# Patient Record
Sex: FEMALE | Race: WHITE | Employment: UNEMPLOYED | URBAN - METROPOLITAN AREA
[De-identification: names, ages, dates, MRNs, and addresses within clinical notes are randomized per-mention and may not be internally consistent; named-entity substitution may affect disease eponyms.]

---

## 2020-02-25 ENCOUNTER — HOSPITAL ENCOUNTER (OUTPATIENT)
Dept: LAB | Age: 65
Discharge: HOME | End: 2020-02-25

## 2020-02-25 ENCOUNTER — HOSPITAL ENCOUNTER (OUTPATIENT)
Dept: PREADMISSION TESTING | Age: 65
Discharge: HOME | End: 2020-02-25
Payer: COMMERCIAL

## 2020-02-25 LAB
ALBUMIN SERPL-MCNC: 3.7 G/DL (ref 3.4–5)
ALBUMIN/GLOB SERPL: 0.9 {RATIO} (ref 0.8–1.7)
ALP SERPL-CCNC: 188 U/L (ref 45–117)
ALT SERPL-CCNC: 36 U/L (ref 13–56)
ANION GAP SERPL CALC-SCNC: 5 MMOL/L (ref 3–18)
AST SERPL-CCNC: 26 U/L (ref 10–38)
BASOPHILS # BLD: 0 K/UL (ref 0–0.1)
BASOPHILS NFR BLD: 1 % (ref 0–2)
BILIRUB SERPL-MCNC: 0.4 MG/DL (ref 0.2–1)
BUN SERPL-MCNC: 20 MG/DL (ref 7–18)
BUN/CREAT SERPL: 25 (ref 12–20)
CALCIUM SERPL-MCNC: 9.6 MG/DL (ref 8.5–10.1)
CHLORIDE SERPL-SCNC: 105 MMOL/L (ref 100–111)
CO2 SERPL-SCNC: 32 MMOL/L (ref 21–32)
CREAT SERPL-MCNC: 0.8 MG/DL (ref 0.6–1.3)
DIFFERENTIAL METHOD BLD: ABNORMAL
EOSINOPHIL # BLD: 0.3 K/UL (ref 0–0.4)
EOSINOPHIL NFR BLD: 3 % (ref 0–5)
ERYTHROCYTE [DISTWIDTH] IN BLOOD BY AUTOMATED COUNT: 14.7 % (ref 11.6–14.5)
GLOBULIN SER CALC-MCNC: 3.9 G/DL (ref 2–4)
GLUCOSE SERPL-MCNC: 82 MG/DL (ref 74–99)
HCT VFR BLD AUTO: 41.7 % (ref 35–45)
HGB BLD-MCNC: 13.3 G/DL (ref 12–16)
LYMPHOCYTES # BLD: 1.8 K/UL (ref 0.9–3.6)
LYMPHOCYTES NFR BLD: 23 % (ref 21–52)
MCH RBC QN AUTO: 28.3 PG (ref 24–34)
MCHC RBC AUTO-ENTMCNC: 31.9 G/DL (ref 31–37)
MCV RBC AUTO: 88.7 FL (ref 74–97)
MONOCYTES # BLD: 0.7 K/UL (ref 0.05–1.2)
MONOCYTES NFR BLD: 9 % (ref 3–10)
NEUTS SEG # BLD: 5 K/UL (ref 1.8–8)
NEUTS SEG NFR BLD: 64 % (ref 40–73)
PLATELET # BLD AUTO: 330 K/UL (ref 135–420)
PMV BLD AUTO: 10.9 FL (ref 9.2–11.8)
POTASSIUM SERPL-SCNC: 3.9 MMOL/L (ref 3.5–5.5)
PROT SERPL-MCNC: 7.6 G/DL (ref 6.4–8.2)
RBC # BLD AUTO: 4.7 M/UL (ref 4.2–5.3)
SODIUM SERPL-SCNC: 142 MMOL/L (ref 136–145)
WBC # BLD AUTO: 7.8 K/UL (ref 4.6–13.2)

## 2020-02-25 PROCEDURE — 36415 COLL VENOUS BLD VENIPUNCTURE: CPT

## 2020-02-25 PROCEDURE — 80053 COMPREHEN METABOLIC PANEL: CPT

## 2020-02-25 PROCEDURE — 85025 COMPLETE CBC W/AUTO DIFF WBC: CPT

## 2020-02-25 PROCEDURE — 93005 ELECTROCARDIOGRAM TRACING: CPT

## 2020-02-25 NOTE — PERIOP NOTES
Per Kevin Waldrop, Dr Aniyah Mcwilliams will update his H&P'S@ [facility]. He will no longer schedule Patients for H&P apointments. He says there is a computer w/ [employer] access that he will use tot updated H&P's for his patients.

## 2020-02-26 LAB
ATRIAL RATE: 67 BPM
CALCULATED P AXIS, ECG09: 71 DEGREES
CALCULATED R AXIS, ECG10: 77 DEGREES
CALCULATED T AXIS, ECG11: 66 DEGREES
DIAGNOSIS, 93000: NORMAL
P-R INTERVAL, ECG05: 142 MS
Q-T INTERVAL, ECG07: 422 MS
QRS DURATION, ECG06: 94 MS
QTC CALCULATION (BEZET), ECG08: 445 MS
VENTRICULAR RATE, ECG03: 67 BPM

## 2020-02-27 ENCOUNTER — APPOINTMENT (OUTPATIENT)
Dept: GENERAL RADIOLOGY | Age: 65
End: 2020-02-27
Attending: SURGERY
Payer: COMMERCIAL

## 2020-02-27 ENCOUNTER — HOSPITAL ENCOUNTER (OUTPATIENT)
Age: 65
Setting detail: OUTPATIENT SURGERY
Discharge: HOME | End: 2020-02-27
Attending: SURGERY | Admitting: SURGERY
Payer: COMMERCIAL

## 2020-02-27 ENCOUNTER — HOSPITAL ENCOUNTER (OUTPATIENT)
Dept: NUCLEAR MEDICINE | Age: 65
Discharge: HOME | End: 2020-02-27
Attending: SURGERY
Payer: COMMERCIAL

## 2020-02-27 ENCOUNTER — ANESTHESIA EVENT (OUTPATIENT)
Dept: SURGERY | Age: 65
End: 2020-02-27
Payer: COMMERCIAL

## 2020-02-27 ENCOUNTER — ANESTHESIA (OUTPATIENT)
Dept: SURGERY | Age: 65
End: 2020-02-27
Payer: COMMERCIAL

## 2020-02-27 VITALS
HEART RATE: 84 BPM | DIASTOLIC BLOOD PRESSURE: 81 MMHG | TEMPERATURE: 97.4 F | BODY MASS INDEX: 29.27 KG/M2 | SYSTOLIC BLOOD PRESSURE: 139 MMHG | RESPIRATION RATE: 18 BRPM | OXYGEN SATURATION: 94 % | HEIGHT: 61 IN | WEIGHT: 155.06 LBS

## 2020-02-27 DIAGNOSIS — Z98.890 S/P LUMPECTOMY, LEFT BREAST: Primary | ICD-10-CM

## 2020-02-27 DIAGNOSIS — C50.412 MALIGNANT NEOPLASM OF UPPER-OUTER QUADRANT OF LEFT FEMALE BREAST (HCC): ICD-10-CM

## 2020-02-27 PROCEDURE — 77030031753 HC SHR ENDO COAG HARM J&J -E: Performed by: SURGERY

## 2020-02-27 PROCEDURE — 77030012508 HC MSK AIRWY LMA AMBU -A: Performed by: ANESTHESIOLOGY

## 2020-02-27 PROCEDURE — 74011000258 HC RX REV CODE- 258: Performed by: REGISTERED NURSE

## 2020-02-27 PROCEDURE — 77030010507 HC ADH SKN DERMBND J&J -B: Performed by: SURGERY

## 2020-02-27 PROCEDURE — 76060000035 HC ANESTHESIA 2 TO 2.5 HR: Performed by: SURGERY

## 2020-02-27 PROCEDURE — 77030003028 HC SUT VCRL J&J -A: Performed by: SURGERY

## 2020-02-27 PROCEDURE — 74011250637 HC RX REV CODE- 250/637: Performed by: ANESTHESIOLOGY

## 2020-02-27 PROCEDURE — 77030002933 HC SUT MCRYL J&J -A: Performed by: SURGERY

## 2020-02-27 PROCEDURE — 77030002996 HC SUT SLK J&J -A: Performed by: SURGERY

## 2020-02-27 PROCEDURE — 77030013079 HC BLNKT BAIR HGGR 3M -A: Performed by: ANESTHESIOLOGY

## 2020-02-27 PROCEDURE — 77030008462 HC STPLR SKN PROX J&J -A: Performed by: SURGERY

## 2020-02-27 PROCEDURE — 88307 TISSUE EXAM BY PATHOLOGIST: CPT

## 2020-02-27 PROCEDURE — 77030031139 HC SUT VCRL2 J&J -A: Performed by: SURGERY

## 2020-02-27 PROCEDURE — 74011250636 HC RX REV CODE- 250/636: Performed by: REGISTERED NURSE

## 2020-02-27 PROCEDURE — 76010000131 HC OR TIME 2 TO 2.5 HR: Performed by: SURGERY

## 2020-02-27 PROCEDURE — 74011250636 HC RX REV CODE- 250/636: Performed by: SURGERY

## 2020-02-27 PROCEDURE — 76210000021 HC REC RM PH II 0.5 TO 1 HR: Performed by: SURGERY

## 2020-02-27 PROCEDURE — 74011000250 HC RX REV CODE- 250: Performed by: SURGERY

## 2020-02-27 PROCEDURE — 74011000250 HC RX REV CODE- 250: Performed by: REGISTERED NURSE

## 2020-02-27 PROCEDURE — 38792 RA TRACER ID OF SENTINL NODE: CPT

## 2020-02-27 PROCEDURE — 88331 PATH CONSLTJ SURG 1 BLK 1SPC: CPT

## 2020-02-27 PROCEDURE — 74011636320 HC RX REV CODE- 636/320: Performed by: SURGERY

## 2020-02-27 PROCEDURE — 74011250636 HC RX REV CODE- 250/636: Performed by: ANESTHESIOLOGY

## 2020-02-27 PROCEDURE — 88342 IMHCHEM/IMCYTCHM 1ST ANTB: CPT

## 2020-02-27 PROCEDURE — 77030020782 HC GWN BAIR PAWS FLX 3M -B: Performed by: SURGERY

## 2020-02-27 PROCEDURE — 76210000006 HC OR PH I REC 0.5 TO 1 HR: Performed by: SURGERY

## 2020-02-27 RX ORDER — DEXTROSE MONOHYDRATE 100 MG/ML
125-250 INJECTION, SOLUTION INTRAVENOUS AS NEEDED
Status: DISCONTINUED | OUTPATIENT
Start: 2020-02-27 | End: 2020-02-27 | Stop reason: HOSPADM

## 2020-02-27 RX ORDER — FENTANYL CITRATE 50 UG/ML
50 INJECTION, SOLUTION INTRAMUSCULAR; INTRAVENOUS AS NEEDED
Status: DISCONTINUED | OUTPATIENT
Start: 2020-02-27 | End: 2020-02-27 | Stop reason: HOSPADM

## 2020-02-27 RX ORDER — GLYCOPYRROLATE 0.2 MG/ML
INJECTION INTRAMUSCULAR; INTRAVENOUS AS NEEDED
Status: DISCONTINUED | OUTPATIENT
Start: 2020-02-27 | End: 2020-02-27 | Stop reason: HOSPADM

## 2020-02-27 RX ORDER — ONDANSETRON 2 MG/ML
INJECTION INTRAMUSCULAR; INTRAVENOUS AS NEEDED
Status: DISCONTINUED | OUTPATIENT
Start: 2020-02-27 | End: 2020-02-27 | Stop reason: HOSPADM

## 2020-02-27 RX ORDER — MAGNESIUM SULFATE 100 %
4 CRYSTALS MISCELLANEOUS AS NEEDED
Status: DISCONTINUED | OUTPATIENT
Start: 2020-02-27 | End: 2020-02-27 | Stop reason: HOSPADM

## 2020-02-27 RX ORDER — HYDROMORPHONE HYDROCHLORIDE 2 MG/ML
0.5 INJECTION, SOLUTION INTRAMUSCULAR; INTRAVENOUS; SUBCUTANEOUS
Status: DISCONTINUED | OUTPATIENT
Start: 2020-02-27 | End: 2020-02-27 | Stop reason: HOSPADM

## 2020-02-27 RX ORDER — MIDAZOLAM HYDROCHLORIDE 1 MG/ML
INJECTION, SOLUTION INTRAMUSCULAR; INTRAVENOUS AS NEEDED
Status: DISCONTINUED | OUTPATIENT
Start: 2020-02-27 | End: 2020-02-27 | Stop reason: HOSPADM

## 2020-02-27 RX ORDER — BUPIVACAINE HYDROCHLORIDE AND EPINEPHRINE 2.5; 5 MG/ML; UG/ML
INJECTION, SOLUTION EPIDURAL; INFILTRATION; INTRACAUDAL; PERINEURAL AS NEEDED
Status: DISCONTINUED | OUTPATIENT
Start: 2020-02-27 | End: 2020-02-27 | Stop reason: HOSPADM

## 2020-02-27 RX ORDER — KETAMINE HCL IN 0.9 % NACL 50 MG/5 ML
SYRINGE (ML) INTRAVENOUS AS NEEDED
Status: DISCONTINUED | OUTPATIENT
Start: 2020-02-27 | End: 2020-02-27 | Stop reason: HOSPADM

## 2020-02-27 RX ORDER — SODIUM CHLORIDE 0.9 % (FLUSH) 0.9 %
5-40 SYRINGE (ML) INJECTION AS NEEDED
Status: DISCONTINUED | OUTPATIENT
Start: 2020-02-27 | End: 2020-02-27 | Stop reason: HOSPADM

## 2020-02-27 RX ORDER — LIDOCAINE HYDROCHLORIDE 20 MG/ML
INJECTION, SOLUTION EPIDURAL; INFILTRATION; INTRACAUDAL; PERINEURAL AS NEEDED
Status: DISCONTINUED | OUTPATIENT
Start: 2020-02-27 | End: 2020-02-27 | Stop reason: HOSPADM

## 2020-02-27 RX ORDER — ISOSULFAN BLUE 50 MG/5ML
INJECTION, SOLUTION SUBCUTANEOUS AS NEEDED
Status: DISCONTINUED | OUTPATIENT
Start: 2020-02-27 | End: 2020-02-27 | Stop reason: HOSPADM

## 2020-02-27 RX ORDER — DEXAMETHASONE SODIUM PHOSPHATE 4 MG/ML
INJECTION, SOLUTION INTRA-ARTICULAR; INTRALESIONAL; INTRAMUSCULAR; INTRAVENOUS; SOFT TISSUE AS NEEDED
Status: DISCONTINUED | OUTPATIENT
Start: 2020-02-27 | End: 2020-02-27 | Stop reason: HOSPADM

## 2020-02-27 RX ORDER — PROCHLORPERAZINE EDISYLATE 5 MG/ML
5 INJECTION INTRAMUSCULAR; INTRAVENOUS ONCE
Status: COMPLETED | OUTPATIENT
Start: 2020-02-27 | End: 2020-02-27

## 2020-02-27 RX ORDER — SODIUM CHLORIDE 0.9 % (FLUSH) 0.9 %
5-40 SYRINGE (ML) INJECTION EVERY 8 HOURS
Status: DISCONTINUED | OUTPATIENT
Start: 2020-02-27 | End: 2020-02-27 | Stop reason: HOSPADM

## 2020-02-27 RX ORDER — CEFAZOLIN SODIUM 2 G/50ML
2 SOLUTION INTRAVENOUS ONCE
Status: COMPLETED | OUTPATIENT
Start: 2020-02-27 | End: 2020-02-27

## 2020-02-27 RX ORDER — PROPOFOL 10 MG/ML
INJECTION, EMULSION INTRAVENOUS AS NEEDED
Status: DISCONTINUED | OUTPATIENT
Start: 2020-02-27 | End: 2020-02-27 | Stop reason: HOSPADM

## 2020-02-27 RX ORDER — NALOXONE HYDROCHLORIDE 0.4 MG/ML
0.1 INJECTION, SOLUTION INTRAMUSCULAR; INTRAVENOUS; SUBCUTANEOUS AS NEEDED
Status: DISCONTINUED | OUTPATIENT
Start: 2020-02-27 | End: 2020-02-27 | Stop reason: HOSPADM

## 2020-02-27 RX ORDER — KETOROLAC TROMETHAMINE 15 MG/ML
INJECTION, SOLUTION INTRAMUSCULAR; INTRAVENOUS AS NEEDED
Status: DISCONTINUED | OUTPATIENT
Start: 2020-02-27 | End: 2020-02-27 | Stop reason: HOSPADM

## 2020-02-27 RX ORDER — OXYCODONE AND ACETAMINOPHEN 5; 325 MG/1; MG/1
1 TABLET ORAL
Qty: 20 TAB | Refills: 0 | Status: SHIPPED | OUTPATIENT
Start: 2020-02-27 | End: 2020-03-03

## 2020-02-27 RX ORDER — HYDRALAZINE HYDROCHLORIDE 20 MG/ML
10 INJECTION INTRAMUSCULAR; INTRAVENOUS ONCE
Status: COMPLETED | OUTPATIENT
Start: 2020-02-27 | End: 2020-02-27

## 2020-02-27 RX ORDER — SODIUM CHLORIDE, SODIUM LACTATE, POTASSIUM CHLORIDE, CALCIUM CHLORIDE 600; 310; 30; 20 MG/100ML; MG/100ML; MG/100ML; MG/100ML
125 INJECTION, SOLUTION INTRAVENOUS CONTINUOUS
Status: DISCONTINUED | OUTPATIENT
Start: 2020-02-27 | End: 2020-02-27 | Stop reason: HOSPADM

## 2020-02-27 RX ORDER — FLUMAZENIL 0.1 MG/ML
0.2 INJECTION INTRAVENOUS
Status: DISCONTINUED | OUTPATIENT
Start: 2020-02-27 | End: 2020-02-27 | Stop reason: HOSPADM

## 2020-02-27 RX ORDER — ACETAMINOPHEN 500 MG
1000 TABLET ORAL ONCE
Status: COMPLETED | OUTPATIENT
Start: 2020-02-27 | End: 2020-02-27

## 2020-02-27 RX ORDER — ONDANSETRON 8 MG/1
8 TABLET, ORALLY DISINTEGRATING ORAL
Qty: 12 TAB | Refills: 0 | Status: SHIPPED | OUTPATIENT
Start: 2020-02-27

## 2020-02-27 RX ADMIN — PROCHLORPERAZINE EDISYLATE 5 MG: 5 INJECTION INTRAMUSCULAR; INTRAVENOUS at 14:47

## 2020-02-27 RX ADMIN — PHENYLEPHRINE HYDROCHLORIDE 50 MCG: 10 INJECTION INTRAVENOUS at 12:33

## 2020-02-27 RX ADMIN — PHENYLEPHRINE HYDROCHLORIDE 50 MCG: 10 INJECTION INTRAVENOUS at 13:40

## 2020-02-27 RX ADMIN — Medication 40 MG: at 12:19

## 2020-02-27 RX ADMIN — SODIUM CHLORIDE, SODIUM LACTATE, POTASSIUM CHLORIDE, AND CALCIUM CHLORIDE: 600; 310; 30; 20 INJECTION, SOLUTION INTRAVENOUS at 13:19

## 2020-02-27 RX ADMIN — HYDRALAZINE HYDROCHLORIDE 10 MG: 20 INJECTION INTRAMUSCULAR; INTRAVENOUS at 10:51

## 2020-02-27 RX ADMIN — ACETAMINOPHEN 1000 MG: 500 TABLET ORAL at 12:01

## 2020-02-27 RX ADMIN — KETOROLAC TROMETHAMINE 30 MG: 15 INJECTION, SOLUTION INTRAMUSCULAR; INTRAVENOUS at 14:21

## 2020-02-27 RX ADMIN — CEFAZOLIN SODIUM 2 G: 2 SOLUTION INTRAVENOUS at 12:23

## 2020-02-27 RX ADMIN — PHENYLEPHRINE HYDROCHLORIDE 50 MCG: 10 INJECTION INTRAVENOUS at 13:07

## 2020-02-27 RX ADMIN — PHENYLEPHRINE HYDROCHLORIDE 50 MCG: 10 INJECTION INTRAVENOUS at 13:31

## 2020-02-27 RX ADMIN — MIDAZOLAM 2 MG: 1 INJECTION INTRAMUSCULAR; INTRAVENOUS at 12:13

## 2020-02-27 RX ADMIN — DEXAMETHASONE SODIUM PHOSPHATE 4 MG: 4 INJECTION, SOLUTION INTRAMUSCULAR; INTRAVENOUS at 12:27

## 2020-02-27 RX ADMIN — SODIUM CHLORIDE, SODIUM LACTATE, POTASSIUM CHLORIDE, AND CALCIUM CHLORIDE 125 ML/HR: 600; 310; 30; 20 INJECTION, SOLUTION INTRAVENOUS at 10:18

## 2020-02-27 RX ADMIN — PHENYLEPHRINE HYDROCHLORIDE 50 MCG: 10 INJECTION INTRAVENOUS at 13:50

## 2020-02-27 RX ADMIN — GLYCOPYRROLATE 0.1 MG: 0.2 INJECTION INTRAMUSCULAR; INTRAVENOUS at 12:28

## 2020-02-27 RX ADMIN — PROPOFOL 200 MG: 10 INJECTION, EMULSION INTRAVENOUS at 12:19

## 2020-02-27 RX ADMIN — LIDOCAINE HYDROCHLORIDE 100 MG: 20 INJECTION, SOLUTION EPIDURAL; INFILTRATION; INTRACAUDAL; PERINEURAL at 12:16

## 2020-02-27 RX ADMIN — ONDANSETRON HYDROCHLORIDE 4 MG: 2 INJECTION INTRAMUSCULAR; INTRAVENOUS at 12:28

## 2020-02-27 RX ADMIN — PHENYLEPHRINE HYDROCHLORIDE 50 MCG: 10 INJECTION INTRAVENOUS at 13:21

## 2020-02-27 RX ADMIN — Medication 10 MG: at 12:42

## 2020-02-27 NOTE — INTERVAL H&P NOTE
H&P Update: 
Danita Davis was seen and examined. History and physical has been reviewed. The patient has been examined.  There have been no significant clinical changes since the completion of the originally dated History and Physical.

## 2020-02-27 NOTE — ANESTHESIA PREPROCEDURE EVALUATION
Relevant Problems   No relevant active problems       Anesthetic History               Review of Systems / Medical History  Patient summary reviewed and nursing notes reviewed    Pulmonary            Asthma : well controlled       Neuro/Psych   Within defined limits           Cardiovascular    Hypertension              Exercise tolerance: >4 METS     GI/Hepatic/Renal  Within defined limits              Endo/Other  Within defined limits           Other Findings              Physical Exam    Airway  Mallampati: II  TM Distance: 4 - 6 cm  Neck ROM: normal range of motion   Mouth opening: Normal     Cardiovascular               Dental  No notable dental hx       Pulmonary                 Abdominal  GI exam deferred       Other Findings            Anesthetic Plan    ASA: 2  Anesthesia type: general          Induction: Intravenous  Anesthetic plan and risks discussed with: Patient

## 2020-02-27 NOTE — PERIOP NOTES
Discharge instructions completed - opportunity for questions -AVS med list reviewed - denies c/o pain - tolerating po fluids

## 2020-02-27 NOTE — ANESTHESIA POSTPROCEDURE EVALUATION
Procedure(s):  LEFTPARTIAL BREAST MASTECTOMY (NEEDLE LOCALIZED),LEFT AXILLARY SENTINEL LYMPH NODE BIOPSY X 4, POSSIBLE COMPLETION AXILLARY LYMPH  NODE DISSECTION (WIRE LOC @ 8:30) NUC MED @ 10:00.    general    Anesthesia Post Evaluation      Multimodal analgesia: multimodal analgesia used between 6 hours prior to anesthesia start to PACU discharge  Patient location during evaluation: PACU  Level of consciousness: awake  Pain management: satisfactory to patient  Airway patency: patent  Anesthetic complications: no  Cardiovascular status: acceptable  Respiratory status: acceptable  Hydration status: acceptable  Post anesthesia nausea and vomiting:  none      Vitals Value Taken Time   /89 2/27/2020  2:55 PM   Temp 36.4 °C (97.6 °F) 2/27/2020  2:35 PM   Pulse 86 2/27/2020  2:59 PM   Resp 19 2/27/2020  2:59 PM   SpO2 96 % 2/27/2020  2:59 PM   Vitals shown include unvalidated device data.

## 2020-02-27 NOTE — PERIOP NOTES
TRANSFER - OUT REPORT:    Verbal report given to Chani(name) on Marisa Johnson  being transferred to [facility](unit) for routine progression of care       Report consisted of patients Situation, Background, Assessment and   Recommendations(SBAR). Information from the following report(s) SBAR was reviewed with the receiving nurse. Lines:   Peripheral IV 02/27/20 Posterior;Right Hand (Active)   Site Assessment Clean, dry, & intact 2/27/2020 10:18 AM   Phlebitis Assessment 0 2/27/2020 10:18 AM   Infiltration Assessment 0 2/27/2020 10:18 AM   Dressing Status Clean, dry, & intact 2/27/2020 10:18 AM   Dressing Type Transparent 2/27/2020 10:18 AM   Hub Color/Line Status Pink 2/27/2020 10:18 AM        Opportunity for questions and clarification was provided.       Patient transported with:   [employer]

## 2020-02-27 NOTE — DISCHARGE INSTRUCTIONS
Patient armband removed and shredded  DISCHARGE SUMMARY from Nurse    PATIENT INSTRUCTIONS:    After general anesthesia or intravenous sedation, for 24 hours or while taking prescription Narcotics:  · Limit your activities  · Do not drive and operate hazardous machinery  · Do not make important personal or business decisions  · Do  not drink alcoholic beverages  · If you have not urinated within 8 hours after discharge, please contact your surgeon on call. Report the following to your surgeon:  · Excessive pain, swelling, redness or odor of or around the surgical area  · Temperature over 100.5  · Nausea and vomiting lasting longer than 4 hours or if unable to take medications  · Any signs of decreased circulation or nerve impairment to extremity: change in color, persistent  numbness, tingling, coldness or increase pain  · Any questions    What to do at Home:  Recommended activity: Activity as tolerated, Activity as tolerated and no driving for today and Ambulate in house,     If you experience any of the following symptoms elevated temp, pain not relieved by medications prescribed for you, please follow up with Dr. Jorge Lang.    *  Please give a list of your current medications to your Primary Care Provider. *  Please update this list whenever your medications are discontinued, doses are      changed, or new medications (including over-the-counter products) are added. *  Please carry medication information at all times in case of emergency situations. These are general instructions for a healthy lifestyle:    No smoking/ No tobacco products/ Avoid exposure to second hand smoke  Surgeon General's Warning:  Quitting smoking now greatly reduces serious risk to your health.     Obesity, smoking, and sedentary lifestyle greatly increases your risk for illness    A healthy diet, regular physical exercise & weight monitoring are important for maintaining a healthy lifestyle    You may be retaining fluid if you have a history of heart failure or if you experience any of the following symptoms:  Weight gain of 3 pounds or more overnight or 5 pounds in a week, increased swelling in our hands or feet or shortness of breath while lying flat in bed. Please call your doctor as soon as you notice any of these symptoms; do not wait until your next office visit. The discharge information has been reviewed with the patient and caregiver. The patient and caregiver verbalized understanding. Discharge medications reviewed with the patient and caregiver and appropriate educational materials and side effects teaching were provided.   ___________________________________________________________________________________________________________________________________

## 2020-02-27 NOTE — BRIEF OP NOTE
BRIEF OPERATIVE NOTE    Date of Procedure: 2/27/2020   Preoperative Diagnosis: LEFT BREAST CANCER  Postoperative Diagnosis: LEFT BREAST CANCER    Procedure(s):  LEFT PARTIAL BREAST MASTECTOMY (NEEDLE LOCALIZED), LEFT AXILLARY SENTINEL LYMPH NODE BIOPSY X 4    Surgeon(s) and Role:     * Devaughn Chowdary MD - Primary         Surgical Assistant: none    Surgical Staff:  Circ-1: Wai Medina  Circ-Relief: Cari Osman RN  Radiology Technician: Migdalia Zhang  Scrub Tech-1: Jose Eben  Surg Asst-1: Adolph Omar  Surg Asst-Relief: Rexene Alcantara  Event Time In Time Out   Incision Start 1235    Incision Close 1421      Anesthesia: General   Estimated Blood Loss: minimal  Specimens:   ID Type Source Tests Collected by Time Destination   1 : LEFT PARTIAL MASTECTOMY Fresh Breast  Devaughn Chowdary MD 2/27/2020 1245 Pathology   2 : SENTINEL LYMPH NODE #1 Frozen Section Breast  Devaughn Chowdary MD 2/27/2020 1259 Pathology   3 : SENTINEL LYMPH NODE #2 Frozen Section Breast  Devaughn Chowdary MD 2/27/2020 1306 Pathology   4 : SENTINEL NODE #3 Frozen Section Breast  Devaughn Chowdary MD 2/27/2020 1316 Pathology   5 : sentinel lymph node #4 Frozen Section Breast  Devaughn Chowdary MD 2/27/2020 1320 Pathology      Findings: n/a   Complications: none  Implants: * No implants in log *    Op noted dictated #[id number]  RP

## 2020-02-27 NOTE — PERIOP NOTES
Spoke with Dr Mcmahan Matters made him aware of pt's BP readings orders received spoke with [employer] RN asked to recheck BP when pt arrives in holding she agreed to do so.

## 2020-02-27 NOTE — PERIOP NOTES
Reviewed PTA medication list with patient/caregiver and patient/caregiver denies any additional medications. Patient admits to having a responsible adult care for them for at least 24 hours after surgery.     Dual skin assessment completed by Edith STEWART and Christopher Schroeder RN.

## 2020-02-28 NOTE — OP NOTES
[facility]  OPERATIVE REPORT    Name:  Karen Reyna  MR#:   [id number]  :  1955  ACCOUNT #:  [de-identified]  DATE OF SERVICE:  2020    PREOPERATIVE DIAGNOSIS:  Left breast cancer (infiltrating lobular carcinoma, grade II). POSTOPERATIVE DIAGNOSIS:  Left breast cancer (infiltrating lobular carcinoma, grade II. PROCEDURES PERFORMED:  1. Left partial mastectomy. 2.  Left axillary sentinel lymph node biopsy x4 for intraoperative frozen section analysis and subsequent permanent pathology. SURGEON:  Sean Wills MD.    ASSISTANT:  None. ANESTHESIA:  General.    COMPLICATIONS:  None. SPECIMENS REMOVED:  1. Left partial mastectomy for permanent. 2.  Left axillary sentinel lymph node biopsy x4 (sent individually for initial frozen section analysis and subsequent permanent pathology). DRAINS/IMPLANTS:  None. ESTIMATED BLOOD LOSS:  Minimal.    PROCEDURE:  The patient is a 35-year-old white female referred to me from her Primary Care and Radiology for newly diagnosed infiltrated lobular carcinoma in the upper outer quadrant of the left breast.  I discussed with her the nature of surgery, alternatives, benefits and risks. She gave consent to proceed. On the morning of the procedure, she had preoperative needle localization and guidewire placement in radiology. Mammogram images were taken with the guidewire in place. She was then brought to Nuclear Medicine for she had preoperative lymph node scintigraphy injection. She was then brought up to the preoperative holding area where she was met and identified by the operating room team.  She was then brought to the operating room. Preoperative intravenous Ancef 2 g was given per protocol. She was positioned on the table. All pressure points were appropriately padded. Sequential compression devices were applied. General anesthesia was administered with monitors and oxygen in place.   Prior to sterile prep and drape, the area around the nipple was alcohol swabbed and an intradermal 4-quadrant injection of Lymphazurin dye was done, a total of 1 mL was injected. The guidewire was then measured externally and cut to appropriate length. The area was prepped and draped in usual sterile fashion with shoulder roll in place. After a surgical pause, we began the procedure by locally anesthetizing the surgical site, first the mastectomy and later the axillary site. A 4 cm curvilinear incision over the underlying localized lesion was done along her natural skin lines (Roxi's lines) with a 15 blade through the skin, dermis, and through the superficial breast tissue. Skin flaps were raised in all directions. Using Bovie cauterization, the underlying localized lesion was removed with a grossly normal margin in all directions. The depth extended down to just above the pectoralis fascia, but did not get to the pectoralis fascia itself. The lesion was marked with a long silk suture marking lateral aspect, short silk suture marking superior and cephalad aspect, and undyed Vicryl marking the deepest point of the resection. Using the C-arm, I x-rayed the specimen anterior, posterior and cranial caudal.  The clips in the lesion were right in the center of the specimen. It was then sent for permanent pathology. A moistened Ray-Marcela was placed in the partial mastectomy incision after ensuring good hemostasis with Bovie cauterization of any discrete bleeding points. We then localized anesthetized the right axillary site at a point of maximum NeoProbe signal.  A 4 cm incision was made here along her natural skin lines with 15-blade through skin and dermis. The underlying subcutaneous tissue was Bovie dissected down through the clavipectoral fascia. We entered the axillary fat pad.   A total of four sentinel lymph nodes were identified, dissected with a combination of gentle blunt Bovie, and where discrete lymphatics were identified by blue dye, Harmonic scalpel dissection. [place] lymph node #1 had an in vivo count of 2815 and ex vivo count of 3131. [place] lymph node #2 had an in vivo count of 498 and ex vivo count of 665. [place] lymph node #3 had an in vivo count of 487 and ex vivo count of 667. [place] lymph node #4 had an in vivo count of 385 and ex vivo count of 479. Background noise was made, and after removal of the four sentinel lymph nodes, was well below the 10% threshold. All four sentinel lymph nodes came back by intraoperative frozen section analysis as negative for any invasive cancer; therefore, the procedure was concluded. I injected additional deep local anesthesia at both the partial mastectomy and axillary sites, irrigated both sites. Closure was done in layers at both incisions with interrupted 3-0 Vicryl dermal sutures followed by running 4-0 Monocryl subcuticular suture to close skin. Both Incisions were then cleaned, dried, and dressed with Dermabond. After the Dermabond was completely dried, a fluff pressure dressing was applied over the partial mastectomy site. A surgical bra was applied. The patient tolerated the entire procedure without incident. She was extubated in the OR and taken to recovery room postoperatively in stable condition.       Aleksandra Go MD      [id number]  D:  02/27/2020 14:49  T:  02/27/2020 22:33  JOB #:  [id number]  CC:  MD Cele Cobb MD

## (undated) DEVICE — DERMABOND SKIN ADH 0.7ML -- DERMABOND ADVANCED 12/BX

## (undated) DEVICE — SHEAR RMFG HARMONIC FOCUS 9CM -- OEM ITEM L#322125

## (undated) DEVICE — SUTURE PERMA-HAND SZ 3-0 L18IN NONABSORBABLE BLK L24MM PS-1 1684G

## (undated) DEVICE — DRAPE,CHEST,FENES,15X10,STERIL: Brand: MEDLINE

## (undated) DEVICE — SUTURE VCRL SZ 3-0 L27IN ABSRB UD L26MM SH 1/2 CIR J416H

## (undated) DEVICE — COVER US PRB W4XL15IN GAM CRDLSS FLD

## (undated) DEVICE — SUTURE PERMA HND SZ 2 0 L18IN NONABSORBABLE BLK L19MM PS 2 583H

## (undated) DEVICE — STERILE POLYISOPRENE POWDER-FREE SURGICAL GLOVES WITH EMOLLIENT COATING: Brand: PROTEXIS

## (undated) DEVICE — SUTURE VCRL SZ 2-0 L12X18IN ABSRB UD POLYGLACTIN 910 BRAID J911T

## (undated) DEVICE — MAJOR: Brand: MEDLINE INDUSTRIES, INC.

## (undated) DEVICE — SUTURE MCRYL SZ 4-0 L18IN ABSRB UD L19MM PS-2 3/8 CIR PRIM Y496G

## (undated) DEVICE — Device

## (undated) DEVICE — STERILE POLYISOPRENE POWDER-FREE SURGICAL GLOVES: Brand: PROTEXIS

## (undated) DEVICE — DRAPE TWL SURG 16X26IN BLU ORB04] ALLCARE INC]